# Patient Record
Sex: FEMALE | Race: ASIAN | NOT HISPANIC OR LATINO | URBAN - METROPOLITAN AREA
[De-identification: names, ages, dates, MRNs, and addresses within clinical notes are randomized per-mention and may not be internally consistent; named-entity substitution may affect disease eponyms.]

---

## 2022-06-08 ENCOUNTER — NEW REFERRAL (OUTPATIENT)
Dept: URBAN - METROPOLITAN AREA CLINIC 101 | Facility: CLINIC | Age: 87
End: 2022-06-08

## 2022-06-08 DIAGNOSIS — H47.092: ICD-10-CM

## 2022-06-08 DIAGNOSIS — H35.3132: ICD-10-CM

## 2022-06-08 DIAGNOSIS — E11.3212: ICD-10-CM

## 2022-06-08 DIAGNOSIS — H43.811: ICD-10-CM

## 2022-06-08 DIAGNOSIS — E11.3291: ICD-10-CM

## 2022-06-08 DIAGNOSIS — H40.1130: ICD-10-CM

## 2022-06-08 PROCEDURE — 92134 CPTRZ OPH DX IMG PST SGM RTA: CPT

## 2022-06-08 PROCEDURE — 92202 OPSCPY EXTND ON/MAC DRAW: CPT

## 2022-06-08 PROCEDURE — 92004 COMPRE OPH EXAM NEW PT 1/>: CPT

## 2022-06-08 ASSESSMENT — VISUAL ACUITY
OD_SC: 20/25
OD_PH: 20/20-1
OS_SC: 20/20-2

## 2022-06-08 ASSESSMENT — TONOMETRY
OD_IOP_MMHG: 21
OS_IOP_MMHG: 24
OS_IOP_MMHG: 26

## 2022-07-18 ENCOUNTER — FOLLOW UP (OUTPATIENT)
Dept: URBAN - METROPOLITAN AREA CLINIC 101 | Facility: CLINIC | Age: 87
End: 2022-07-18

## 2022-07-18 DIAGNOSIS — H40.1130: ICD-10-CM

## 2022-07-18 DIAGNOSIS — E11.3212: ICD-10-CM

## 2022-07-18 DIAGNOSIS — E11.3291: ICD-10-CM

## 2022-07-18 DIAGNOSIS — H43.811: ICD-10-CM

## 2022-07-18 DIAGNOSIS — H47.092: ICD-10-CM

## 2022-07-18 DIAGNOSIS — H35.3132: ICD-10-CM

## 2022-07-18 PROCEDURE — 92014 COMPRE OPH EXAM EST PT 1/>: CPT

## 2022-07-18 PROCEDURE — 92202 OPSCPY EXTND ON/MAC DRAW: CPT

## 2022-07-18 PROCEDURE — 92134 CPTRZ OPH DX IMG PST SGM RTA: CPT

## 2022-07-18 ASSESSMENT — TONOMETRY
OS_IOP_MMHG: 22
OD_IOP_MMHG: 23

## 2022-07-18 ASSESSMENT — VISUAL ACUITY
OD_SC: 20/25-2
OS_SC: 20/25-1

## 2023-01-16 ENCOUNTER — FOLLOW UP (OUTPATIENT)
Dept: URBAN - METROPOLITAN AREA CLINIC 101 | Facility: CLINIC | Age: 88
End: 2023-01-16

## 2023-01-16 DIAGNOSIS — H43.811: ICD-10-CM

## 2023-01-16 DIAGNOSIS — E11.3212: ICD-10-CM

## 2023-01-16 DIAGNOSIS — E11.3291: ICD-10-CM

## 2023-01-16 DIAGNOSIS — H35.3122: ICD-10-CM

## 2023-01-16 DIAGNOSIS — H35.3211: ICD-10-CM

## 2023-01-16 PROCEDURE — 92202 OPSCPY EXTND ON/MAC DRAW: CPT | Mod: NC

## 2023-01-16 PROCEDURE — 92250 FUNDUS PHOTOGRAPHY W/I&R: CPT | Mod: NC

## 2023-01-16 PROCEDURE — 92235 FLUORESCEIN ANGRPH MLTIFRAME: CPT

## 2023-01-16 PROCEDURE — PFS EYLEA PFS

## 2023-01-16 PROCEDURE — 67028 INJECTION EYE DRUG: CPT

## 2023-01-16 PROCEDURE — 92134 CPTRZ OPH DX IMG PST SGM RTA: CPT

## 2023-01-16 PROCEDURE — 92014 COMPRE OPH EXAM EST PT 1/>: CPT | Mod: 25

## 2023-01-16 ASSESSMENT — TONOMETRY
OD_IOP_MMHG: 18
OS_IOP_MMHG: 18

## 2023-01-16 ASSESSMENT — VISUAL ACUITY
OD_SC: 20/50-2
OS_SC: 20/20-1

## 2023-02-01 ENCOUNTER — APPOINTMENT (OUTPATIENT)
Age: 88
Setting detail: DERMATOLOGY
End: 2023-02-01

## 2023-02-01 DIAGNOSIS — L82.1 OTHER SEBORRHEIC KERATOSIS: ICD-10-CM

## 2023-02-01 DIAGNOSIS — L81.4 OTHER MELANIN HYPERPIGMENTATION: ICD-10-CM

## 2023-02-01 DIAGNOSIS — D17 BENIGN LIPOMATOUS NEOPLASM: ICD-10-CM | Status: INADEQUATELY CONTROLLED

## 2023-02-01 PROBLEM — D17.22 BENIGN LIPOMATOUS NEOPLASM OF SKIN AND SUBCUTANEOUS TISSUE OF LEFT ARM: Status: ACTIVE | Noted: 2023-02-01

## 2023-02-01 PROCEDURE — ? DIAGNOSIS COMMENT

## 2023-02-01 PROCEDURE — 99203 OFFICE O/P NEW LOW 30 MIN: CPT

## 2023-02-01 PROCEDURE — ? TREATMENT REGIMEN

## 2023-02-01 PROCEDURE — ? COUNSELING

## 2023-02-01 ASSESSMENT — LOCATION DETAILED DESCRIPTION DERM
LOCATION DETAILED: PERIUMBILICAL SKIN
LOCATION DETAILED: LEFT POSTERIOR SHOULDER
LOCATION DETAILED: RIGHT LATERAL ABDOMEN

## 2023-02-01 ASSESSMENT — LOCATION SIMPLE DESCRIPTION DERM
LOCATION SIMPLE: ABDOMEN
LOCATION SIMPLE: LEFT SHOULDER

## 2023-02-01 ASSESSMENT — LOCATION ZONE DERM
LOCATION ZONE: ARM
LOCATION ZONE: TRUNK

## 2023-02-01 NOTE — PROCEDURE: TREATMENT REGIMEN
Detail Level: Simple
Plan: Benign nature of the lesion discussed. Explained to patient that excision is the only way to get rid of the lipoma. Risks and benefits of surgery discussed. Explained to patient that she must weighs the risks vs benefits of excision. Patient reports that because pain in the region is transient and mild she prefers to avoid surgery at this time. Instructed patient to discontinue use of herbal medicine patch as it is causing irritation of the skin.
Otc Regimen: Tylenol or ibuprofen as needed for pain

## 2023-02-01 NOTE — HPI: RASH
What Type Of Note Output Would You Prefer (Optional)?: Standard Output
How Severe Is Your Rash?: moderate
Is This A New Presentation, Or A Follow-Up?: Rash
Additional History: Patient has had rash for 5-6 years. Rash will occasionally flare up and is painful. Patient’s doctors believed rash was a lipoma so it was not treated.  She has been using a Chinese herbal medication patch.

## 2023-02-01 NOTE — PROCEDURE: DIAGNOSIS COMMENT
Comment: Patient previously diagnosed with lipoma. She uses herbal medicinal patches on the area. She reports flares are infrequent but painful.
Render Risk Assessment In Note?: no
Detail Level: Simple

## 2023-02-15 ENCOUNTER — FOLLOW UP (OUTPATIENT)
Dept: URBAN - METROPOLITAN AREA CLINIC 101 | Facility: CLINIC | Age: 88
End: 2023-02-15

## 2023-02-15 DIAGNOSIS — E11.3291: ICD-10-CM

## 2023-02-15 DIAGNOSIS — H35.3122: ICD-10-CM

## 2023-02-15 DIAGNOSIS — H35.3211: ICD-10-CM

## 2023-02-15 DIAGNOSIS — E11.3212: ICD-10-CM

## 2023-02-15 PROCEDURE — 92202 OPSCPY EXTND ON/MAC DRAW: CPT | Mod: 59

## 2023-02-15 PROCEDURE — PFS EYLEA PFS

## 2023-02-15 PROCEDURE — 92014 COMPRE OPH EXAM EST PT 1/>: CPT | Mod: 25

## 2023-02-15 PROCEDURE — 67028 INJECTION EYE DRUG: CPT

## 2023-02-15 PROCEDURE — 92134 CPTRZ OPH DX IMG PST SGM RTA: CPT

## 2023-02-15 ASSESSMENT — TONOMETRY
OS_IOP_MMHG: 20
OD_IOP_MMHG: 16

## 2023-02-15 ASSESSMENT — VISUAL ACUITY
OD_SC: 20/50-2
OS_SC: 20/20

## 2023-03-16 ENCOUNTER — FOLLOW UP (OUTPATIENT)
Dept: URBAN - METROPOLITAN AREA CLINIC 101 | Facility: CLINIC | Age: 88
End: 2023-03-16

## 2023-03-16 DIAGNOSIS — H35.3211: ICD-10-CM

## 2023-03-16 DIAGNOSIS — E11.3212: ICD-10-CM

## 2023-03-16 DIAGNOSIS — E11.3291: ICD-10-CM

## 2023-03-16 DIAGNOSIS — H35.3122: ICD-10-CM

## 2023-03-16 PROCEDURE — PFS EYLEA PFS

## 2023-03-16 PROCEDURE — 92134 CPTRZ OPH DX IMG PST SGM RTA: CPT

## 2023-03-16 PROCEDURE — 92014 COMPRE OPH EXAM EST PT 1/>: CPT | Mod: 25

## 2023-03-16 PROCEDURE — 67028 INJECTION EYE DRUG: CPT

## 2023-03-16 PROCEDURE — 92202 OPSCPY EXTND ON/MAC DRAW: CPT | Mod: NC

## 2023-03-16 ASSESSMENT — TONOMETRY
OS_IOP_MMHG: 21
OD_IOP_MMHG: 25

## 2023-03-16 ASSESSMENT — VISUAL ACUITY: OD_SC: 20/30-2

## 2023-04-13 ENCOUNTER — FOLLOW UP (OUTPATIENT)
Dept: URBAN - METROPOLITAN AREA CLINIC 101 | Facility: CLINIC | Age: 88
End: 2023-04-13

## 2023-04-13 DIAGNOSIS — H35.3122: ICD-10-CM

## 2023-04-13 DIAGNOSIS — H35.3211: ICD-10-CM

## 2023-04-13 DIAGNOSIS — E11.3212: ICD-10-CM

## 2023-04-13 DIAGNOSIS — E11.3291: ICD-10-CM

## 2023-04-13 DIAGNOSIS — H43.811: ICD-10-CM

## 2023-04-13 PROCEDURE — 92202 OPSCPY EXTND ON/MAC DRAW: CPT | Mod: 59

## 2023-04-13 PROCEDURE — 92014 COMPRE OPH EXAM EST PT 1/>: CPT | Mod: 25

## 2023-04-13 PROCEDURE — PFS EYLEA PFS

## 2023-04-13 PROCEDURE — 67028 INJECTION EYE DRUG: CPT

## 2023-04-13 ASSESSMENT — VISUAL ACUITY
OS_SC: 20/20-1
OD_SC: 20/50-1

## 2023-04-13 ASSESSMENT — TONOMETRY
OD_IOP_MMHG: 22
OS_IOP_MMHG: 18

## 2023-05-15 ENCOUNTER — FOLLOW UP (OUTPATIENT)
Dept: URBAN - METROPOLITAN AREA CLINIC 101 | Facility: CLINIC | Age: 88
End: 2023-05-15

## 2023-05-15 DIAGNOSIS — H35.3122: ICD-10-CM

## 2023-05-15 DIAGNOSIS — E11.3212: ICD-10-CM

## 2023-05-15 DIAGNOSIS — E11.3291: ICD-10-CM

## 2023-05-15 DIAGNOSIS — H35.3211: ICD-10-CM

## 2023-05-15 PROCEDURE — 92014 COMPRE OPH EXAM EST PT 1/>: CPT | Mod: 25

## 2023-05-15 PROCEDURE — PFS EYLEA PFS

## 2023-05-15 PROCEDURE — 67028 INJECTION EYE DRUG: CPT

## 2023-05-15 PROCEDURE — 92202 OPSCPY EXTND ON/MAC DRAW: CPT | Mod: 59

## 2023-05-15 PROCEDURE — 92134 CPTRZ OPH DX IMG PST SGM RTA: CPT

## 2023-05-15 ASSESSMENT — VISUAL ACUITY
OS_SC: 20/25-3
OD_PH: 20/50-1
OD_SC: 20/60
OS_PH: 20/20-1

## 2023-05-15 ASSESSMENT — TONOMETRY
OS_IOP_MMHG: 17
OD_IOP_MMHG: 16

## 2023-06-15 ENCOUNTER — FOLLOW UP (OUTPATIENT)
Dept: URBAN - METROPOLITAN AREA CLINIC 101 | Facility: CLINIC | Age: 88
End: 2023-06-15

## 2023-06-15 DIAGNOSIS — E11.3212: ICD-10-CM

## 2023-06-15 DIAGNOSIS — H35.3122: ICD-10-CM

## 2023-06-15 DIAGNOSIS — H35.3211: ICD-10-CM

## 2023-06-15 DIAGNOSIS — E11.3291: ICD-10-CM

## 2023-06-15 PROCEDURE — 92202 OPSCPY EXTND ON/MAC DRAW: CPT | Mod: NC

## 2023-06-15 PROCEDURE — 92134 CPTRZ OPH DX IMG PST SGM RTA: CPT

## 2023-06-15 PROCEDURE — 92014 COMPRE OPH EXAM EST PT 1/>: CPT | Mod: 25

## 2023-06-15 PROCEDURE — 67028 INJECTION EYE DRUG: CPT

## 2023-06-15 PROCEDURE — PFS EYLEA PFS

## 2023-06-15 ASSESSMENT — TONOMETRY
OS_IOP_MMHG: 16
OD_IOP_MMHG: 17

## 2023-06-15 ASSESSMENT — VISUAL ACUITY
OD_SC: 20/40-2
OS_SC: 20/20

## 2023-07-20 ENCOUNTER — FOLLOW UP (OUTPATIENT)
Dept: URBAN - METROPOLITAN AREA CLINIC 101 | Facility: CLINIC | Age: 88
End: 2023-07-20

## 2023-07-20 DIAGNOSIS — E11.3291: ICD-10-CM

## 2023-07-20 DIAGNOSIS — H43.811: ICD-10-CM

## 2023-07-20 DIAGNOSIS — E11.3212: ICD-10-CM

## 2023-07-20 DIAGNOSIS — H35.3231: ICD-10-CM

## 2023-07-20 PROCEDURE — 92202 OPSCPY EXTND ON/MAC DRAW: CPT | Mod: 59

## 2023-07-20 PROCEDURE — 92014 COMPRE OPH EXAM EST PT 1/>: CPT | Mod: 25

## 2023-07-20 PROCEDURE — 92134 CPTRZ OPH DX IMG PST SGM RTA: CPT

## 2023-07-20 PROCEDURE — PFS EYLEA PFS: Mod: JZ

## 2023-07-20 PROCEDURE — 67028 INJECTION EYE DRUG: CPT

## 2023-07-20 ASSESSMENT — TONOMETRY
OS_IOP_MMHG: 14
OD_IOP_MMHG: 16

## 2023-07-20 ASSESSMENT — VISUAL ACUITY
OD_SC: 20/30-2
OS_SC: 20/30

## 2023-07-26 ENCOUNTER — INJECTION ONLY (OUTPATIENT)
Dept: URBAN - METROPOLITAN AREA CLINIC 101 | Facility: CLINIC | Age: 88
End: 2023-07-26

## 2023-07-26 DIAGNOSIS — H35.3231: ICD-10-CM

## 2023-07-26 PROCEDURE — PFS EYLEA PFS: Mod: JZ

## 2023-07-26 PROCEDURE — 67028 INJECTION EYE DRUG: CPT

## 2023-07-26 ASSESSMENT — TONOMETRY
OS_IOP_MMHG: 16
OD_IOP_MMHG: 19

## 2023-07-26 ASSESSMENT — VISUAL ACUITY
OS_CC: 20/30-2
OD_SC: 20/50-1
OS_SC: 20/30-2
OD_PH: 20/40

## 2023-08-23 ENCOUNTER — FOLLOW UP (OUTPATIENT)
Dept: URBAN - METROPOLITAN AREA CLINIC 101 | Facility: CLINIC | Age: 88
End: 2023-08-23

## 2023-08-23 DIAGNOSIS — H35.3231: ICD-10-CM

## 2023-08-23 DIAGNOSIS — E11.3293: ICD-10-CM

## 2023-08-23 PROCEDURE — 92014 COMPRE OPH EXAM EST PT 1/>: CPT

## 2023-08-23 PROCEDURE — 92202 OPSCPY EXTND ON/MAC DRAW: CPT

## 2023-08-23 PROCEDURE — 92134 CPTRZ OPH DX IMG PST SGM RTA: CPT

## 2023-08-23 ASSESSMENT — VISUAL ACUITY
OS_SC: 20/20
OD_SC: 20/30-1

## 2023-08-23 ASSESSMENT — TONOMETRY
OD_IOP_MMHG: 17
OS_IOP_MMHG: 19

## 2023-09-27 ENCOUNTER — FOLLOW UP (OUTPATIENT)
Dept: URBAN - METROPOLITAN AREA CLINIC 101 | Facility: CLINIC | Age: 88
End: 2023-09-27

## 2023-09-27 DIAGNOSIS — H35.3231: ICD-10-CM

## 2023-09-27 DIAGNOSIS — E11.3293: ICD-10-CM

## 2023-09-27 PROCEDURE — 92134 CPTRZ OPH DX IMG PST SGM RTA: CPT

## 2023-09-27 PROCEDURE — 92202 OPSCPY EXTND ON/MAC DRAW: CPT

## 2023-09-27 PROCEDURE — 92014 COMPRE OPH EXAM EST PT 1/>: CPT

## 2023-09-27 ASSESSMENT — VISUAL ACUITY
OS_SC: 20/20-1
OD_PH: 20/30-1
OD_SC: 20/40

## 2023-09-27 ASSESSMENT — TONOMETRY
OS_IOP_MMHG: 18
OD_IOP_MMHG: 18

## 2023-10-26 ENCOUNTER — FOLLOW UP (OUTPATIENT)
Dept: URBAN - METROPOLITAN AREA CLINIC 101 | Facility: CLINIC | Age: 88
End: 2023-10-26

## 2023-10-26 DIAGNOSIS — E11.3293: ICD-10-CM

## 2023-10-26 DIAGNOSIS — H35.3231: ICD-10-CM

## 2023-10-26 PROCEDURE — 92202 OPSCPY EXTND ON/MAC DRAW: CPT

## 2023-10-26 PROCEDURE — 92134 CPTRZ OPH DX IMG PST SGM RTA: CPT

## 2023-10-26 PROCEDURE — 92014 COMPRE OPH EXAM EST PT 1/>: CPT

## 2023-10-26 ASSESSMENT — VISUAL ACUITY
OD_SC: 20/50-1
OD_PH: 20/40-1
OS_PH: 20/30
OS_SC: 20/40+2

## 2023-10-26 ASSESSMENT — TONOMETRY
OD_IOP_MMHG: 20
OS_IOP_MMHG: 20

## 2023-12-06 ENCOUNTER — FOLLOW UP (OUTPATIENT)
Dept: URBAN - METROPOLITAN AREA CLINIC 101 | Facility: CLINIC | Age: 88
End: 2023-12-06

## 2023-12-06 DIAGNOSIS — H35.3231: ICD-10-CM

## 2023-12-06 DIAGNOSIS — E11.3293: ICD-10-CM

## 2023-12-06 PROCEDURE — 92202 OPSCPY EXTND ON/MAC DRAW: CPT | Mod: 59

## 2023-12-06 PROCEDURE — HD EYLEA HD 8MG: Mod: JZ

## 2023-12-06 PROCEDURE — 67028 INJECTION EYE DRUG: CPT

## 2023-12-06 PROCEDURE — 92014 COMPRE OPH EXAM EST PT 1/>: CPT | Mod: 25

## 2023-12-06 PROCEDURE — 92134 CPTRZ OPH DX IMG PST SGM RTA: CPT

## 2023-12-06 ASSESSMENT — VISUAL ACUITY
OD_SC: 20/50+1
OS_SC: 20/30+2
OD_PH: 20/40

## 2023-12-06 ASSESSMENT — TONOMETRY
OD_IOP_MMHG: 14
OS_IOP_MMHG: 16

## 2024-01-11 ENCOUNTER — FOLLOW UP (OUTPATIENT)
Dept: URBAN - METROPOLITAN AREA CLINIC 101 | Facility: CLINIC | Age: 89
End: 2024-01-11

## 2024-01-11 DIAGNOSIS — H35.3231: ICD-10-CM

## 2024-01-11 DIAGNOSIS — E11.3293: ICD-10-CM

## 2024-01-11 PROCEDURE — 92202 OPSCPY EXTND ON/MAC DRAW: CPT | Mod: 59

## 2024-01-11 PROCEDURE — 92134 CPTRZ OPH DX IMG PST SGM RTA: CPT

## 2024-01-11 PROCEDURE — 67028 INJECTION EYE DRUG: CPT

## 2024-01-11 PROCEDURE — 92014 COMPRE OPH EXAM EST PT 1/>: CPT | Mod: 25

## 2024-01-11 PROCEDURE — HD EYLEA HD 8MG: Mod: JZ

## 2024-01-11 ASSESSMENT — TONOMETRY
OS_IOP_MMHG: 17
OD_IOP_MMHG: 16

## 2024-01-11 ASSESSMENT — VISUAL ACUITY
OD_PH: 20/30-1
OD_SC: 20/40
OS_SC: 20/25+2

## 2024-02-15 ENCOUNTER — FOLLOW UP (OUTPATIENT)
Dept: URBAN - METROPOLITAN AREA CLINIC 101 | Facility: CLINIC | Age: 89
End: 2024-02-15

## 2024-02-15 DIAGNOSIS — H43.811: ICD-10-CM

## 2024-02-15 DIAGNOSIS — E11.3293: ICD-10-CM

## 2024-02-15 DIAGNOSIS — H35.3231: ICD-10-CM

## 2024-02-15 PROCEDURE — HD EYLEA HD 8MG: Mod: JZ

## 2024-02-15 PROCEDURE — 67028 INJECTION EYE DRUG: CPT | Mod: 50

## 2024-02-15 PROCEDURE — 92134 CPTRZ OPH DX IMG PST SGM RTA: CPT

## 2024-02-15 PROCEDURE — 92202 OPSCPY EXTND ON/MAC DRAW: CPT | Mod: 59

## 2024-02-15 PROCEDURE — 92014 COMPRE OPH EXAM EST PT 1/>: CPT | Mod: 25

## 2024-02-15 ASSESSMENT — VISUAL ACUITY
OD_SC: 20/30-1
OS_SC: 20/20
OD_PH: 20/20

## 2024-02-15 ASSESSMENT — TONOMETRY
OS_IOP_MMHG: 16
OD_IOP_MMHG: 18

## 2024-03-14 ENCOUNTER — FOLLOW UP (OUTPATIENT)
Dept: URBAN - METROPOLITAN AREA CLINIC 101 | Facility: CLINIC | Age: 89
End: 2024-03-14

## 2024-03-14 DIAGNOSIS — E11.3293: ICD-10-CM

## 2024-03-14 DIAGNOSIS — H43.811: ICD-10-CM

## 2024-03-14 DIAGNOSIS — H35.3231: ICD-10-CM

## 2024-03-14 PROCEDURE — 67028 INJECTION EYE DRUG: CPT | Mod: 50

## 2024-03-14 PROCEDURE — 92134 CPTRZ OPH DX IMG PST SGM RTA: CPT

## 2024-03-14 PROCEDURE — 92014 COMPRE OPH EXAM EST PT 1/>: CPT | Mod: 25

## 2024-03-14 PROCEDURE — 92202 OPSCPY EXTND ON/MAC DRAW: CPT | Mod: NC

## 2024-03-14 ASSESSMENT — VISUAL ACUITY
OD_SC: 20/40
OD_PH: 20/25
OS_SC: 20/20

## 2024-03-14 ASSESSMENT — TONOMETRY
OD_IOP_MMHG: 15
OS_IOP_MMHG: 15

## 2024-05-16 ENCOUNTER — FOLLOW UP (OUTPATIENT)
Dept: URBAN - METROPOLITAN AREA CLINIC 101 | Facility: CLINIC | Age: 89
End: 2024-05-16

## 2024-05-16 DIAGNOSIS — E11.3293: ICD-10-CM

## 2024-05-16 DIAGNOSIS — H35.3231: ICD-10-CM

## 2024-05-16 DIAGNOSIS — H43.811: ICD-10-CM

## 2024-05-16 PROCEDURE — 92134 CPTRZ OPH DX IMG PST SGM RTA: CPT

## 2024-05-16 PROCEDURE — 92014 COMPRE OPH EXAM EST PT 1/>: CPT | Mod: 25

## 2024-05-16 PROCEDURE — 67028 INJECTION EYE DRUG: CPT | Mod: 50

## 2024-05-16 PROCEDURE — 92202 OPSCPY EXTND ON/MAC DRAW: CPT | Mod: NC

## 2024-05-16 ASSESSMENT — VISUAL ACUITY
OS_SC: 20/20
OD_PH: 20/25-2
OD_SC: 20/40+2

## 2024-05-16 ASSESSMENT — TONOMETRY
OD_IOP_MMHG: 20
OS_IOP_MMHG: 16

## 2024-07-25 ENCOUNTER — FOLLOW UP (OUTPATIENT)
Dept: URBAN - METROPOLITAN AREA CLINIC 101 | Facility: CLINIC | Age: 89
End: 2024-07-25

## 2024-07-25 DIAGNOSIS — E11.3293: ICD-10-CM

## 2024-07-25 DIAGNOSIS — H43.811: ICD-10-CM

## 2024-07-25 DIAGNOSIS — H35.3231: ICD-10-CM

## 2024-07-25 PROCEDURE — 92014 COMPRE OPH EXAM EST PT 1/>: CPT | Mod: 25

## 2024-07-25 PROCEDURE — 92134 CPTRZ OPH DX IMG PST SGM RTA: CPT

## 2024-07-25 PROCEDURE — 67028 INJECTION EYE DRUG: CPT | Mod: 50

## 2024-07-25 PROCEDURE — 92202 OPSCPY EXTND ON/MAC DRAW: CPT | Mod: NC

## 2024-07-25 ASSESSMENT — TONOMETRY
OD_IOP_MMHG: 15
OS_IOP_MMHG: 17

## 2024-07-25 ASSESSMENT — VISUAL ACUITY
OD_SC: 20/30-1
OS_SC: 20/20
OD_PH: 20/25-1

## 2024-10-10 ENCOUNTER — FOLLOW UP (OUTPATIENT)
Dept: URBAN - METROPOLITAN AREA CLINIC 101 | Facility: CLINIC | Age: 89
End: 2024-10-10

## 2024-10-10 DIAGNOSIS — E11.3293: ICD-10-CM

## 2024-10-10 DIAGNOSIS — H43.811: ICD-10-CM

## 2024-10-10 DIAGNOSIS — H35.3231: ICD-10-CM

## 2024-10-10 PROCEDURE — 92014 COMPRE OPH EXAM EST PT 1/>: CPT | Mod: 25

## 2024-10-10 PROCEDURE — 92202 OPSCPY EXTND ON/MAC DRAW: CPT | Mod: NC

## 2024-10-10 PROCEDURE — 92134 CPTRZ OPH DX IMG PST SGM RTA: CPT

## 2024-10-10 PROCEDURE — 67028 INJECTION EYE DRUG: CPT | Mod: 50

## 2024-10-10 ASSESSMENT — TONOMETRY
OS_IOP_MMHG: 19
OD_IOP_MMHG: 17

## 2024-10-10 ASSESSMENT — VISUAL ACUITY
OD_SC: 20/40-2
OS_SC: 20/30

## 2025-01-09 ENCOUNTER — FOLLOW UP (OUTPATIENT)
Dept: URBAN - METROPOLITAN AREA CLINIC 101 | Facility: CLINIC | Age: 89
End: 2025-01-09

## 2025-01-09 DIAGNOSIS — H43.811: ICD-10-CM

## 2025-01-09 DIAGNOSIS — H35.3231: ICD-10-CM

## 2025-01-09 DIAGNOSIS — E11.3293: ICD-10-CM

## 2025-01-09 PROCEDURE — 92014 COMPRE OPH EXAM EST PT 1/>: CPT | Mod: 25

## 2025-01-09 PROCEDURE — 92202 OPSCPY EXTND ON/MAC DRAW: CPT | Mod: NC

## 2025-01-09 PROCEDURE — 92134 CPTRZ OPH DX IMG PST SGM RTA: CPT

## 2025-01-09 PROCEDURE — 67028 INJECTION EYE DRUG: CPT | Mod: 50

## 2025-01-09 ASSESSMENT — VISUAL ACUITY
OS_SC: 20/30-2
OD_PH: 20/30-2
OD_SC: 20/40

## 2025-01-09 ASSESSMENT — TONOMETRY
OD_IOP_MMHG: 16
OS_IOP_MMHG: 18

## 2025-04-17 ENCOUNTER — FOLLOW UP (OUTPATIENT)
Dept: URBAN - METROPOLITAN AREA CLINIC 101 | Facility: CLINIC | Age: 89
End: 2025-04-17

## 2025-04-17 DIAGNOSIS — H35.3231: ICD-10-CM

## 2025-04-17 DIAGNOSIS — H43.811: ICD-10-CM

## 2025-04-17 DIAGNOSIS — E11.3293: ICD-10-CM

## 2025-04-17 PROCEDURE — 92201 OPSCPY EXTND RTA DRAW UNI/BI: CPT | Mod: NC

## 2025-04-17 PROCEDURE — 92014 COMPRE OPH EXAM EST PT 1/>: CPT | Mod: 25

## 2025-04-17 PROCEDURE — 67028 INJECTION EYE DRUG: CPT | Mod: 50

## 2025-04-17 PROCEDURE — 92134 CPTRZ OPH DX IMG PST SGM RTA: CPT

## 2025-04-17 ASSESSMENT — VISUAL ACUITY
OD_SC: 20/40-2
OS_SC: 20/30

## 2025-04-17 ASSESSMENT — TONOMETRY
OS_IOP_MMHG: 18
OD_IOP_MMHG: 18

## 2025-07-24 ENCOUNTER — FOLLOW UP (OUTPATIENT)
Dept: URBAN - METROPOLITAN AREA CLINIC 101 | Facility: CLINIC | Age: 89
End: 2025-07-24

## 2025-07-24 DIAGNOSIS — E11.3293: ICD-10-CM

## 2025-07-24 DIAGNOSIS — H35.3231: ICD-10-CM

## 2025-07-24 DIAGNOSIS — H43.811: ICD-10-CM

## 2025-07-24 PROCEDURE — 92201 OPSCPY EXTND RTA DRAW UNI/BI: CPT | Mod: NC

## 2025-07-24 PROCEDURE — 92134 CPTRZ OPH DX IMG PST SGM RTA: CPT

## 2025-07-24 PROCEDURE — 92014 COMPRE OPH EXAM EST PT 1/>: CPT | Mod: 25

## 2025-07-24 PROCEDURE — 67028 INJECTION EYE DRUG: CPT | Mod: 50

## 2025-07-24 ASSESSMENT — VISUAL ACUITY
OS_SC: 20/20-1
OD_SC: 20/20-1

## 2025-07-24 ASSESSMENT — TONOMETRY
OD_IOP_MMHG: 18
OS_IOP_MMHG: 20